# Patient Record
Sex: MALE | Race: WHITE | Employment: FULL TIME | URBAN - METROPOLITAN AREA
[De-identification: names, ages, dates, MRNs, and addresses within clinical notes are randomized per-mention and may not be internally consistent; named-entity substitution may affect disease eponyms.]

---

## 2020-04-22 RX ORDER — GUAIFENESIN 100 MG/5ML
81 LIQUID (ML) ORAL DAILY
COMMUNITY

## 2020-04-22 RX ORDER — OXYCODONE HYDROCHLORIDE 5 MG/1
5 TABLET ORAL
COMMUNITY

## 2020-04-22 RX ORDER — IBUPROFEN 200 MG
200 TABLET ORAL
COMMUNITY

## 2020-04-24 ENCOUNTER — ANESTHESIA EVENT (OUTPATIENT)
Dept: SURGERY | Age: 65
End: 2020-04-24
Payer: COMMERCIAL

## 2020-04-24 NOTE — ANESTHESIA PREPROCEDURE EVALUATION
Relevant Problems   No relevant active problems       Anesthetic History   No history of anesthetic complications            Review of Systems / Medical History  Patient summary reviewed, nursing notes reviewed and pertinent labs reviewed    Pulmonary          Smoker         Neuro/Psych   Within defined limits           Cardiovascular  Within defined limits                     GI/Hepatic/Renal  Within defined limits              Endo/Other  Within defined limits           Other Findings              Physical Exam    Airway  Mallampati: II  TM Distance: > 6 cm  Neck ROM: normal range of motion   Mouth opening: Normal     Cardiovascular  Regular rate and rhythm,  S1 and S2 normal,  no murmur, click, rub, or gallop             Dental    Dentition: Upper partial plate     Pulmonary  Breath sounds clear to auscultation               Abdominal  GI exam deferred       Other Findings            Anesthetic Plan    ASA: 2, emergent  Anesthesia type: general      Post-op pain plan if not by surgeon: peripheral nerve block single    Induction: Intravenous  Anesthetic plan and risks discussed with: Patient

## 2020-04-27 ENCOUNTER — ANESTHESIA (OUTPATIENT)
Dept: SURGERY | Age: 65
End: 2020-04-27
Payer: COMMERCIAL

## 2020-04-27 ENCOUNTER — APPOINTMENT (OUTPATIENT)
Dept: GENERAL RADIOLOGY | Age: 65
End: 2020-04-27
Attending: ORTHOPAEDIC SURGERY
Payer: COMMERCIAL

## 2020-04-27 ENCOUNTER — HOSPITAL ENCOUNTER (OUTPATIENT)
Age: 65
Discharge: HOME | End: 2020-04-28
Attending: ORTHOPAEDIC SURGERY | Admitting: ORTHOPAEDIC SURGERY
Payer: COMMERCIAL

## 2020-04-27 PROBLEM — Z98.890 S/P FOOT SURGERY, RIGHT: Status: ACTIVE | Noted: 2020-04-27

## 2020-04-27 PROBLEM — S93.324A CLOSED DISLOCATION OF TARSOMETATARSAL JOINT OF RIGHT FOOT: Status: ACTIVE | Noted: 2020-04-27

## 2020-04-27 LAB — HGB BLD-MCNC: 12.2 G/DL (ref 12.1–17)

## 2020-04-27 PROCEDURE — 74011250636 HC RX REV CODE- 250/636: Performed by: ANESTHESIOLOGY

## 2020-04-27 PROCEDURE — C1734 ORTH/DEVIC/DRUG BN/BN,TIS/BN: HCPCS | Performed by: ORTHOPAEDIC SURGERY

## 2020-04-27 PROCEDURE — 77030040361 HC SLV COMPR DVT MDII -B

## 2020-04-27 PROCEDURE — 74011250636 HC RX REV CODE- 250/636: Performed by: NURSE ANESTHETIST, CERTIFIED REGISTERED

## 2020-04-27 PROCEDURE — 76060000041 HC ANESTHESIA 5 TO 5.5 HR: Performed by: ORTHOPAEDIC SURGERY

## 2020-04-27 PROCEDURE — 74011250636 HC RX REV CODE- 250/636: Performed by: ORTHOPAEDIC SURGERY

## 2020-04-27 PROCEDURE — 99218 HC RM OBSERVATION: CPT

## 2020-04-27 PROCEDURE — 73620 X-RAY EXAM OF FOOT: CPT

## 2020-04-27 PROCEDURE — 85018 HEMOGLOBIN: CPT

## 2020-04-27 PROCEDURE — 74011000250 HC RX REV CODE- 250: Performed by: NURSE ANESTHETIST, CERTIFIED REGISTERED

## 2020-04-27 PROCEDURE — 77030002916 HC SUT ETHLN J&J -A: Performed by: ORTHOPAEDIC SURGERY

## 2020-04-27 PROCEDURE — 77030031139 HC SUT VCRL2 J&J -A: Performed by: ORTHOPAEDIC SURGERY

## 2020-04-27 PROCEDURE — 77030002933 HC SUT MCRYL J&J -A: Performed by: ORTHOPAEDIC SURGERY

## 2020-04-27 PROCEDURE — 77030020274 HC MISC IMPL ORTHOPEDIC: Performed by: ORTHOPAEDIC SURGERY

## 2020-04-27 PROCEDURE — 77030006788 HC BLD SAW OSC STRY -B: Performed by: ORTHOPAEDIC SURGERY

## 2020-04-27 PROCEDURE — 77030021807 HC PIN TEMP FIX WRGH -B: Performed by: ORTHOPAEDIC SURGERY

## 2020-04-27 PROCEDURE — C1713 ANCHOR/SCREW BN/BN,TIS/BN: HCPCS | Performed by: ORTHOPAEDIC SURGERY

## 2020-04-27 PROCEDURE — 77030040922 HC BLNKT HYPOTHRM STRY -A

## 2020-04-27 PROCEDURE — 77030020275 HC MISC ORTHOPEDIC: Performed by: ORTHOPAEDIC SURGERY

## 2020-04-27 PROCEDURE — 77030003044 HC WRE K WRGH -B: Performed by: ORTHOPAEDIC SURGERY

## 2020-04-27 PROCEDURE — 77030003045 HC BIT DRL DISP WRGH -C: Performed by: ORTHOPAEDIC SURGERY

## 2020-04-27 PROCEDURE — 77030039497 HC CST PAD STERILE CHCS -A: Performed by: ORTHOPAEDIC SURGERY

## 2020-04-27 PROCEDURE — 77030018836 HC SOL IRR NACL ICUM -A: Performed by: ORTHOPAEDIC SURGERY

## 2020-04-27 PROCEDURE — 77030012893

## 2020-04-27 PROCEDURE — 77030018673: Performed by: ORTHOPAEDIC SURGERY

## 2020-04-27 PROCEDURE — 76010000137 HC OR TIME 5 TO 5.5 HR: Performed by: ORTHOPAEDIC SURGERY

## 2020-04-27 PROCEDURE — 74011000250 HC RX REV CODE- 250: Performed by: ORTHOPAEDIC SURGERY

## 2020-04-27 PROCEDURE — 77030040361 HC SLV COMPR DVT MDII -B: Performed by: ORTHOPAEDIC SURGERY

## 2020-04-27 PROCEDURE — 77010033678 HC OXYGEN DAILY

## 2020-04-27 PROCEDURE — 77030011640 HC PAD GRND REM COVD -A: Performed by: ORTHOPAEDIC SURGERY

## 2020-04-27 PROCEDURE — 77030003601 HC NDL NRV BLK BBMI -A

## 2020-04-27 PROCEDURE — 74011250637 HC RX REV CODE- 250/637: Performed by: ANESTHESIOLOGY

## 2020-04-27 PROCEDURE — 74011250637 HC RX REV CODE- 250/637: Performed by: ORTHOPAEDIC SURGERY

## 2020-04-27 PROCEDURE — 77030000032 HC CUF TRNQT ZIMM -B: Performed by: ORTHOPAEDIC SURGERY

## 2020-04-27 PROCEDURE — 76210000006 HC OR PH I REC 0.5 TO 1 HR: Performed by: ORTHOPAEDIC SURGERY

## 2020-04-27 PROCEDURE — 74011000272 HC RX REV CODE- 272: Performed by: ORTHOPAEDIC SURGERY

## 2020-04-27 PROCEDURE — 77030005513 HC CATH URETH FOL11 MDII -B: Performed by: ORTHOPAEDIC SURGERY

## 2020-04-27 PROCEDURE — 77030012771 HC BIT DRL WRGH -B: Performed by: ORTHOPAEDIC SURGERY

## 2020-04-27 DEVICE — LISFRANC SCREW
Type: IMPLANTABLE DEVICE | Site: FOOT | Status: FUNCTIONAL
Brand: CHARLOTTE

## 2020-04-27 DEVICE — GRAFT BONE TIB INJ 1.5CC ALLOGRFT AUGMENT: Type: IMPLANTABLE DEVICE | Site: FOOT | Status: FUNCTIONAL

## 2020-04-27 RX ORDER — MORPHINE SULFATE 10 MG/ML
2 INJECTION, SOLUTION INTRAMUSCULAR; INTRAVENOUS
Status: DISCONTINUED | OUTPATIENT
Start: 2020-04-27 | End: 2020-04-27 | Stop reason: HOSPADM

## 2020-04-27 RX ORDER — ROPIVACAINE HYDROCHLORIDE 5 MG/ML
150 INJECTION, SOLUTION EPIDURAL; INFILTRATION; PERINEURAL AS NEEDED
Status: DISCONTINUED | OUTPATIENT
Start: 2020-04-27 | End: 2020-04-27 | Stop reason: HOSPADM

## 2020-04-27 RX ORDER — CEFAZOLIN SODIUM/WATER 2 G/20 ML
2 SYRINGE (ML) INTRAVENOUS EVERY 8 HOURS
Status: COMPLETED | OUTPATIENT
Start: 2020-04-27 | End: 2020-04-28

## 2020-04-27 RX ORDER — OXYCODONE HYDROCHLORIDE 5 MG/1
5 TABLET ORAL AS NEEDED
Status: DISCONTINUED | OUTPATIENT
Start: 2020-04-27 | End: 2020-04-27 | Stop reason: HOSPADM

## 2020-04-27 RX ORDER — SODIUM CHLORIDE 9 MG/ML
25 INJECTION, SOLUTION INTRAVENOUS CONTINUOUS
Status: DISCONTINUED | OUTPATIENT
Start: 2020-04-27 | End: 2020-04-27 | Stop reason: HOSPADM

## 2020-04-27 RX ORDER — HYDROMORPHONE HYDROCHLORIDE 2 MG/ML
INJECTION, SOLUTION INTRAMUSCULAR; INTRAVENOUS; SUBCUTANEOUS AS NEEDED
Status: DISCONTINUED | OUTPATIENT
Start: 2020-04-27 | End: 2020-04-27 | Stop reason: HOSPADM

## 2020-04-27 RX ORDER — ONDANSETRON 2 MG/ML
4 INJECTION INTRAMUSCULAR; INTRAVENOUS AS NEEDED
Status: DISCONTINUED | OUTPATIENT
Start: 2020-04-27 | End: 2020-04-27 | Stop reason: HOSPADM

## 2020-04-27 RX ORDER — MIDAZOLAM HYDROCHLORIDE 1 MG/ML
1 INJECTION, SOLUTION INTRAMUSCULAR; INTRAVENOUS AS NEEDED
Status: DISCONTINUED | OUTPATIENT
Start: 2020-04-27 | End: 2020-04-27 | Stop reason: HOSPADM

## 2020-04-27 RX ORDER — MORPHINE SULFATE 2 MG/ML
2 INJECTION, SOLUTION INTRAMUSCULAR; INTRAVENOUS
Status: DISCONTINUED | OUTPATIENT
Start: 2020-04-27 | End: 2020-04-28 | Stop reason: HOSPADM

## 2020-04-27 RX ORDER — NEOSTIGMINE METHYLSULFATE 1 MG/ML
INJECTION, SOLUTION INTRAVENOUS AS NEEDED
Status: DISCONTINUED | OUTPATIENT
Start: 2020-04-27 | End: 2020-04-27 | Stop reason: HOSPADM

## 2020-04-27 RX ORDER — MIDAZOLAM HYDROCHLORIDE 1 MG/ML
INJECTION, SOLUTION INTRAMUSCULAR; INTRAVENOUS AS NEEDED
Status: DISCONTINUED | OUTPATIENT
Start: 2020-04-27 | End: 2020-04-27 | Stop reason: HOSPADM

## 2020-04-27 RX ORDER — FENTANYL CITRATE 50 UG/ML
50 INJECTION, SOLUTION INTRAMUSCULAR; INTRAVENOUS AS NEEDED
Status: DISCONTINUED | OUTPATIENT
Start: 2020-04-27 | End: 2020-04-27 | Stop reason: HOSPADM

## 2020-04-27 RX ORDER — ACETAMINOPHEN 325 MG/1
650 TABLET ORAL ONCE
Status: COMPLETED | OUTPATIENT
Start: 2020-04-27 | End: 2020-04-27

## 2020-04-27 RX ORDER — SUCCINYLCHOLINE CHLORIDE 20 MG/ML
INJECTION INTRAMUSCULAR; INTRAVENOUS AS NEEDED
Status: DISCONTINUED | OUTPATIENT
Start: 2020-04-27 | End: 2020-04-27 | Stop reason: HOSPADM

## 2020-04-27 RX ORDER — PROPOFOL 10 MG/ML
INJECTION, EMULSION INTRAVENOUS AS NEEDED
Status: DISCONTINUED | OUTPATIENT
Start: 2020-04-27 | End: 2020-04-27 | Stop reason: HOSPADM

## 2020-04-27 RX ORDER — GLYCOPYRROLATE 0.2 MG/ML
INJECTION INTRAMUSCULAR; INTRAVENOUS AS NEEDED
Status: DISCONTINUED | OUTPATIENT
Start: 2020-04-27 | End: 2020-04-27 | Stop reason: HOSPADM

## 2020-04-27 RX ORDER — LIDOCAINE HYDROCHLORIDE 10 MG/ML
0.1 INJECTION, SOLUTION EPIDURAL; INFILTRATION; INTRACAUDAL; PERINEURAL AS NEEDED
Status: DISCONTINUED | OUTPATIENT
Start: 2020-04-27 | End: 2020-04-27 | Stop reason: HOSPADM

## 2020-04-27 RX ORDER — LIDOCAINE HYDROCHLORIDE 20 MG/ML
INJECTION, SOLUTION EPIDURAL; INFILTRATION; INTRACAUDAL; PERINEURAL AS NEEDED
Status: DISCONTINUED | OUTPATIENT
Start: 2020-04-27 | End: 2020-04-27 | Stop reason: HOSPADM

## 2020-04-27 RX ORDER — FENTANYL CITRATE 50 UG/ML
INJECTION, SOLUTION INTRAMUSCULAR; INTRAVENOUS AS NEEDED
Status: DISCONTINUED | OUTPATIENT
Start: 2020-04-27 | End: 2020-04-27 | Stop reason: HOSPADM

## 2020-04-27 RX ORDER — DEXAMETHASONE SODIUM PHOSPHATE 4 MG/ML
INJECTION, SOLUTION INTRA-ARTICULAR; INTRALESIONAL; INTRAMUSCULAR; INTRAVENOUS; SOFT TISSUE AS NEEDED
Status: DISCONTINUED | OUTPATIENT
Start: 2020-04-27 | End: 2020-04-27 | Stop reason: HOSPADM

## 2020-04-27 RX ORDER — SODIUM CHLORIDE 0.9 % (FLUSH) 0.9 %
5-40 SYRINGE (ML) INJECTION AS NEEDED
Status: DISCONTINUED | OUTPATIENT
Start: 2020-04-27 | End: 2020-04-28 | Stop reason: HOSPADM

## 2020-04-27 RX ORDER — PHENYLEPHRINE HCL IN 0.9% NACL 0.4MG/10ML
SYRINGE (ML) INTRAVENOUS AS NEEDED
Status: DISCONTINUED | OUTPATIENT
Start: 2020-04-27 | End: 2020-04-27 | Stop reason: HOSPADM

## 2020-04-27 RX ORDER — ROCURONIUM BROMIDE 10 MG/ML
INJECTION, SOLUTION INTRAVENOUS AS NEEDED
Status: DISCONTINUED | OUTPATIENT
Start: 2020-04-27 | End: 2020-04-27 | Stop reason: HOSPADM

## 2020-04-27 RX ORDER — ROPIVACAINE HYDROCHLORIDE 5 MG/ML
INJECTION, SOLUTION EPIDURAL; INFILTRATION; PERINEURAL
Status: COMPLETED | OUTPATIENT
Start: 2020-04-27 | End: 2020-04-27

## 2020-04-27 RX ORDER — SODIUM CHLORIDE, SODIUM LACTATE, POTASSIUM CHLORIDE, CALCIUM CHLORIDE 600; 310; 30; 20 MG/100ML; MG/100ML; MG/100ML; MG/100ML
100 INJECTION, SOLUTION INTRAVENOUS CONTINUOUS
Status: DISCONTINUED | OUTPATIENT
Start: 2020-04-27 | End: 2020-04-27 | Stop reason: HOSPADM

## 2020-04-27 RX ORDER — SODIUM CHLORIDE, SODIUM LACTATE, POTASSIUM CHLORIDE, CALCIUM CHLORIDE 600; 310; 30; 20 MG/100ML; MG/100ML; MG/100ML; MG/100ML
1000 INJECTION, SOLUTION INTRAVENOUS CONTINUOUS
Status: DISCONTINUED | OUTPATIENT
Start: 2020-04-27 | End: 2020-04-27

## 2020-04-27 RX ORDER — SODIUM CHLORIDE, SODIUM LACTATE, POTASSIUM CHLORIDE, CALCIUM CHLORIDE 600; 310; 30; 20 MG/100ML; MG/100ML; MG/100ML; MG/100ML
75 INJECTION, SOLUTION INTRAVENOUS CONTINUOUS
Status: DISPENSED | OUTPATIENT
Start: 2020-04-27 | End: 2020-04-28

## 2020-04-27 RX ORDER — SODIUM CHLORIDE 0.9 % (FLUSH) 0.9 %
5-40 SYRINGE (ML) INJECTION EVERY 8 HOURS
Status: DISCONTINUED | OUTPATIENT
Start: 2020-04-27 | End: 2020-04-28 | Stop reason: HOSPADM

## 2020-04-27 RX ORDER — EPHEDRINE SULFATE/0.9% NACL/PF 50 MG/5 ML
SYRINGE (ML) INTRAVENOUS AS NEEDED
Status: DISCONTINUED | OUTPATIENT
Start: 2020-04-27 | End: 2020-04-27 | Stop reason: HOSPADM

## 2020-04-27 RX ORDER — OXYCODONE HYDROCHLORIDE 5 MG/1
5 TABLET ORAL
Status: DISCONTINUED | OUTPATIENT
Start: 2020-04-27 | End: 2020-04-28 | Stop reason: HOSPADM

## 2020-04-27 RX ORDER — HYDROMORPHONE HYDROCHLORIDE 1 MG/ML
0.2 INJECTION, SOLUTION INTRAMUSCULAR; INTRAVENOUS; SUBCUTANEOUS
Status: DISCONTINUED | OUTPATIENT
Start: 2020-04-27 | End: 2020-04-27 | Stop reason: HOSPADM

## 2020-04-27 RX ORDER — CEFAZOLIN SODIUM/WATER 2 G/20 ML
2 SYRINGE (ML) INTRAVENOUS
Status: COMPLETED | OUTPATIENT
Start: 2020-04-27 | End: 2020-04-27

## 2020-04-27 RX ORDER — DIPHENHYDRAMINE HYDROCHLORIDE 50 MG/ML
12.5 INJECTION, SOLUTION INTRAMUSCULAR; INTRAVENOUS AS NEEDED
Status: DISCONTINUED | OUTPATIENT
Start: 2020-04-27 | End: 2020-04-27 | Stop reason: HOSPADM

## 2020-04-27 RX ORDER — GUAIFENESIN 100 MG/5ML
81 LIQUID (ML) ORAL DAILY
Status: DISCONTINUED | OUTPATIENT
Start: 2020-04-28 | End: 2020-04-28 | Stop reason: HOSPADM

## 2020-04-27 RX ORDER — ACETAMINOPHEN 325 MG/1
650 TABLET ORAL
Status: DISCONTINUED | OUTPATIENT
Start: 2020-04-27 | End: 2020-04-28 | Stop reason: HOSPADM

## 2020-04-27 RX ORDER — EPHEDRINE SULFATE/0.9% NACL/PF 50 MG/5 ML
5 SYRINGE (ML) INTRAVENOUS AS NEEDED
Status: DISCONTINUED | OUTPATIENT
Start: 2020-04-27 | End: 2020-04-27 | Stop reason: HOSPADM

## 2020-04-27 RX ORDER — ONDANSETRON 2 MG/ML
INJECTION INTRAMUSCULAR; INTRAVENOUS AS NEEDED
Status: DISCONTINUED | OUTPATIENT
Start: 2020-04-27 | End: 2020-04-27 | Stop reason: HOSPADM

## 2020-04-27 RX ORDER — MIDAZOLAM HYDROCHLORIDE 1 MG/ML
0.5 INJECTION, SOLUTION INTRAMUSCULAR; INTRAVENOUS
Status: DISCONTINUED | OUTPATIENT
Start: 2020-04-27 | End: 2020-04-27 | Stop reason: HOSPADM

## 2020-04-27 RX ORDER — FENTANYL CITRATE 50 UG/ML
25 INJECTION, SOLUTION INTRAMUSCULAR; INTRAVENOUS
Status: DISCONTINUED | OUTPATIENT
Start: 2020-04-27 | End: 2020-04-27 | Stop reason: HOSPADM

## 2020-04-27 RX ADMIN — HYDROMORPHONE HYDROCHLORIDE 1 MG: 2 INJECTION, SOLUTION INTRAMUSCULAR; INTRAVENOUS; SUBCUTANEOUS at 10:13

## 2020-04-27 RX ADMIN — Medication 10 ML: at 22:26

## 2020-04-27 RX ADMIN — HYDROMORPHONE HYDROCHLORIDE 0.4 MG: 2 INJECTION, SOLUTION INTRAMUSCULAR; INTRAVENOUS; SUBCUTANEOUS at 11:14

## 2020-04-27 RX ADMIN — ROPIVACAINE HYDROCHLORIDE 35 ML: 5 INJECTION, SOLUTION EPIDURAL; INFILTRATION; PERINEURAL at 07:17

## 2020-04-27 RX ADMIN — Medication 40 MCG: at 10:18

## 2020-04-27 RX ADMIN — Medication 80 MCG: at 08:04

## 2020-04-27 RX ADMIN — MIDAZOLAM 2 MG: 1 INJECTION INTRAMUSCULAR; INTRAVENOUS at 07:29

## 2020-04-27 RX ADMIN — ONDANSETRON HYDROCHLORIDE 4 MG: 2 INJECTION, SOLUTION INTRAMUSCULAR; INTRAVENOUS at 08:02

## 2020-04-27 RX ADMIN — MIDAZOLAM 2 MG: 1 INJECTION INTRAMUSCULAR; INTRAVENOUS at 07:05

## 2020-04-27 RX ADMIN — FENTANYL CITRATE 50 MCG: 50 INJECTION, SOLUTION INTRAMUSCULAR; INTRAVENOUS at 08:29

## 2020-04-27 RX ADMIN — PROPOFOL 50 MG: 10 INJECTION, EMULSION INTRAVENOUS at 08:29

## 2020-04-27 RX ADMIN — SODIUM CHLORIDE, SODIUM LACTATE, POTASSIUM CHLORIDE, AND CALCIUM CHLORIDE: 600; 310; 30; 20 INJECTION, SOLUTION INTRAVENOUS at 09:40

## 2020-04-27 RX ADMIN — SODIUM CHLORIDE, SODIUM LACTATE, POTASSIUM CHLORIDE, AND CALCIUM CHLORIDE: 600; 310; 30; 20 INJECTION, SOLUTION INTRAVENOUS at 12:07

## 2020-04-27 RX ADMIN — Medication 2 G: at 07:46

## 2020-04-27 RX ADMIN — PROPOFOL 100 MG: 10 INJECTION, EMULSION INTRAVENOUS at 08:17

## 2020-04-27 RX ADMIN — ROCURONIUM BROMIDE 10 MG: 10 SOLUTION INTRAVENOUS at 08:17

## 2020-04-27 RX ADMIN — GLYCOPYRROLATE 0.2 MG: 0.2 INJECTION, SOLUTION INTRAMUSCULAR; INTRAVENOUS at 11:40

## 2020-04-27 RX ADMIN — OXYCODONE 5 MG: 5 TABLET ORAL at 22:26

## 2020-04-27 RX ADMIN — Medication 2 G: at 11:50

## 2020-04-27 RX ADMIN — FENTANYL CITRATE 25 MCG: 50 INJECTION, SOLUTION INTRAMUSCULAR; INTRAVENOUS at 09:12

## 2020-04-27 RX ADMIN — ROCURONIUM BROMIDE 10 MG: 10 SOLUTION INTRAVENOUS at 10:52

## 2020-04-27 RX ADMIN — Medication 2 MG: at 12:05

## 2020-04-27 RX ADMIN — SODIUM CHLORIDE, SODIUM LACTATE, POTASSIUM CHLORIDE, AND CALCIUM CHLORIDE 1000 ML: 600; 310; 30; 20 INJECTION, SOLUTION INTRAVENOUS at 06:52

## 2020-04-27 RX ADMIN — GLYCOPYRROLATE 0.4 MG: 0.2 INJECTION, SOLUTION INTRAMUSCULAR; INTRAVENOUS at 12:05

## 2020-04-27 RX ADMIN — ROCURONIUM BROMIDE 5 MG: 10 SOLUTION INTRAVENOUS at 07:40

## 2020-04-27 RX ADMIN — Medication 10 ML: at 14:49

## 2020-04-27 RX ADMIN — PROPOFOL 170 MG: 10 INJECTION, EMULSION INTRAVENOUS at 07:40

## 2020-04-27 RX ADMIN — FENTANYL CITRATE 100 MCG: 50 INJECTION INTRAMUSCULAR; INTRAVENOUS at 07:05

## 2020-04-27 RX ADMIN — LIDOCAINE HYDROCHLORIDE 60 MG: 20 INJECTION, SOLUTION EPIDURAL; INFILTRATION; INTRACAUDAL; PERINEURAL at 07:40

## 2020-04-27 RX ADMIN — PROPOFOL 30 MG: 10 INJECTION, EMULSION INTRAVENOUS at 07:50

## 2020-04-27 RX ADMIN — ACETAMINOPHEN 650 MG: 325 TABLET ORAL at 06:43

## 2020-04-27 RX ADMIN — OXYCODONE 5 MG: 5 TABLET ORAL at 18:30

## 2020-04-27 RX ADMIN — SUCCINYLCHOLINE CHLORIDE 160 MG: 20 INJECTION, SOLUTION INTRAMUSCULAR; INTRAVENOUS at 07:40

## 2020-04-27 RX ADMIN — HYDROMORPHONE HYDROCHLORIDE 0.5 MG: 2 INJECTION, SOLUTION INTRAMUSCULAR; INTRAVENOUS; SUBCUTANEOUS at 12:36

## 2020-04-27 RX ADMIN — DEXAMETHASONE SODIUM PHOSPHATE 4 MG: 4 INJECTION, SOLUTION INTRAMUSCULAR; INTRAVENOUS at 08:02

## 2020-04-27 RX ADMIN — FENTANYL CITRATE 25 MCG: 50 INJECTION, SOLUTION INTRAMUSCULAR; INTRAVENOUS at 09:40

## 2020-04-27 RX ADMIN — FENTANYL CITRATE 25 MCG: 50 INJECTION INTRAMUSCULAR; INTRAVENOUS at 12:50

## 2020-04-27 RX ADMIN — Medication 40 MCG: at 08:11

## 2020-04-27 RX ADMIN — FENTANYL CITRATE 25 MCG: 50 INJECTION INTRAMUSCULAR; INTRAVENOUS at 12:45

## 2020-04-27 RX ADMIN — Medication 80 MCG: at 10:37

## 2020-04-27 RX ADMIN — ROCURONIUM BROMIDE 25 MG: 10 SOLUTION INTRAVENOUS at 07:50

## 2020-04-27 RX ADMIN — Medication 10 MG: at 10:24

## 2020-04-27 RX ADMIN — CEFAZOLIN SODIUM 2 G: 300 INJECTION, POWDER, LYOPHILIZED, FOR SOLUTION INTRAVENOUS at 19:22

## 2020-04-27 RX ADMIN — OXYCODONE 5 MG: 5 TABLET ORAL at 14:45

## 2020-04-27 RX ADMIN — Medication 10 MG: at 11:42

## 2020-04-27 RX ADMIN — Medication 40 MCG: at 10:21

## 2020-04-27 NOTE — PROGRESS NOTES
POST OP CHECK:    S/p right foot lisfranc joint dislocation/fracture arthrodesis. Doing okay, no complaints. Patient has a pain block right lower extremity. AFVSS. Alert but still sleepy. No distress. Right foot dressing c/d/i  Foot is warm and has excellent capillary refill. Calves soft and NT. Doing okay post op today. 1. Plan to dc home tomorrow on pod #1 if doing well and pain manageable with p.o.medications. 2. IV antibiotics. 3. Aspirin for DVT prophylaxis. 4. Follow up Dr. Richy Niocle in 13 to 16 days post op. Call if any problems for quicker follow if needed. 5. Splint remains intact, keep c/d and call if any problems with splint. 6. See detailed follow up information in chart. 7. Patient was provided post op medications and should have ready to take at home.

## 2020-04-27 NOTE — PROGRESS NOTES
Bedside and Verbal shift change report given to Corky Ortiz RN (oncoming nurse) by Sherif Garcia RN (offgoing nurse). Report included the following information SBAR, Kardex, Intake/Output, MAR and Recent Results.

## 2020-04-27 NOTE — PROGRESS NOTES
Primary Nurse Thomas Green and Deborah Shanks RN performed a dual skin assessment on this patient No impairment noted  Jorge score is 21

## 2020-04-27 NOTE — ANESTHESIA PROCEDURE NOTES
Peripheral Block    Start time: 4/27/2020 7:05 AM  End time: 4/27/2020 7:17 AM  Performed by: Ria Perez MD  Authorized by: Ria Perez MD       Pre-procedure:    Indications: at surgeon's request, post-op pain management and procedure for pain    Preanesthetic Checklist: patient identified, risks and benefits discussed, site marked, timeout performed, anesthesia consent given and patient being monitored    Timeout Time: 07:05          Block Type:   Block Type:  Popliteal  Laterality:  Right  Monitoring:  Standard ASA monitoring, continuous pulse ox, frequent vital sign checks, heart rate, responsive to questions and oxygen  Injection Technique:  Single shot  Procedures: nerve stimulator    Patient Position: supine  Prep: chlorhexidine    Location:  Lower thigh  Needle Type:  Stimuplex  Needle Gauge:  22 G  Needle Localization:  Nerve stimulator  Motor Response: minimal motor response >0.4 mA      Assessment:  Number of attempts:  1  Injection Assessment:  Incremental injection every 5 mL, negative aspiration for CSF, negative aspiration for blood, no paresthesia and no intravascular symptoms  Patient tolerance:  Patient tolerated the procedure well with no immediate complications

## 2020-04-27 NOTE — ROUTINE PROCESS
Patient: Candelaria Thomas MRN: [id number]  SSN: [id number]   YOB: 1955  Age: 72 y.o. Sex: male     Patient is status post Procedure(s):  RIGHT FOOT TARSOMETATARSAL MULTIPLE ARTHRODESIS (GENERAL W/ BLOCK). Surgeon(s) and Role:     Yossi Leos MD - Primary    Local/Dose/Irrigation:                    Peripheral IV 04/27/20 Left;Posterior Hand (Active)   Site Assessment Clean, dry, & intact 4/27/2020  6:51 AM   Phlebitis Assessment 0 4/27/2020  6:51 AM   Dressing Status Clean, dry, & intact; New 4/27/2020  6:51 AM   Dressing Type Tape;Transparent 4/27/2020  6:51 AM   Hub Color/Line Status Green; Infusing 4/27/2020  6:51 AM            Airway - Endotracheal Tube 04/27/20 Oral (Active)                   Dressing/Packing:  Wound Leg Right-Dressing Type: 4 x 4;ABD pad;Xeroform (04/27/20 1158)    Splint/Cast: Wound Leg Right-Splint Type/Material: Splint, plaster] Ace bandage    Other:

## 2020-04-27 NOTE — ANESTHESIA POSTPROCEDURE EVALUATION
Post-Anesthesia Evaluation and Assessment    Patient: Joo Jane MRN: [id number]  SSN: [id number]    YOB: 1955  Age: 72 y.o. Sex: male      I have evaluated the patient and they are stable and ready for discharge from the PACU. Cardiovascular Function/Vital Signs  Visit Vitals  /50 (BP 1 Location: Right arm, BP Patient Position: At rest)   Pulse 89   Temp 36.7 °C (98.1 °F)   Resp 18   Ht 5' 10\" (1.778 m)   Wt 77.1 kg (170 lb)   SpO2 97%   BMI 24.39 kg/m²       Patient is status post General anesthesia for Procedure(s):  RIGHT FOOT TARSOMETATARSAL MULTIPLE ARTHRODESIS (GENERAL W/ BLOCK). Nausea/Vomiting: None    Postoperative hydration reviewed and adequate. Pain:  Pain Scale 1: FLACC(\"a little bit\" ) (04/27/20 1240)  Pain Intensity 1: 4 (04/27/20 0619)   Managed    Neurological Status:   Neuro (WDL): Within Defined Limits (04/27/20 1240)   At baseline    Mental Status, Level of Consciousness: Alert and  oriented to person, place, and time    Pulmonary Status:   O2 Device: Nasal cannula (04/27/20 1240)   Adequate oxygenation and airway patent    Complications related to anesthesia: None    Post-anesthesia assessment completed. No concerns    Signed By: Shu Head MD     April 27, 2020              Procedure(s):  RIGHT FOOT TARSOMETATARSAL MULTIPLE ARTHRODESIS (GENERAL W/ BLOCK). general    <BSHSIANPOST>    Vitals Value Taken Time   /65 4/27/2020 12:45 PM   Temp 36.7 °C (98.1 °F) 4/27/2020 12:40 PM   Pulse 74 4/27/2020 12:50 PM   Resp 14 4/27/2020 12:50 PM   SpO2 96 % 4/27/2020 12:50 PM   Vitals shown include unvalidated device data.

## 2020-04-27 NOTE — PERIOP NOTES
3107 timeout for a right foot nerve block with Dr. Castillo Sweet. Patient marked, consented, and placed on monitor. Patient was given 2 mg of versed and 100 mcg of fentanyl. 40 ml of 0.5% ropivacaine used for the block. Will continue to monitor.

## 2020-04-27 NOTE — BRIEF OP NOTE
Brief Postoperative Note    Patient: Candelaria Thomas  YOB: 1955  MRN: [id number]    Date of Procedure: 4/27/2020     Pre-Op Diagnosis: RIGHT FOOT TARSOMETATARSAL DISLOCATION WITH FRACTURES    Post-Op Diagnosis: Right foot multiple tarsometatarsal dislocation with fractures, closed, displaced. Procedure(s):  RIGHT FOOT TARSOMETATARSAL MULTIPLE ARTHRODESIS (GENERAL W/ BLOCK) OF 1ST, 2ND, 3RD TMT JONITS, MEDIAL AND MIDDLE CUNIEFORM    Surgeon(s):  Daja Shields MD    Surgical Assistant: None    Anesthesia: General     Estimated Blood Loss (mL): less than 50     Complications: None    Specimens: * No specimens in log *     Implants:   Implant Name Type Inv. Item Serial No.  Lot No. LRB No. Used Action   GRAFT BNE AUGMENT 1.5ML INJCT -- AUGMENT - SN/A  GRAFT BNE AUGMENT 1.5ML INJCT -- AUGMENT N/A [employer] [id number] Right 1 Implanted   3.7x38mm screw Screw  NA [employer] NA Right 1 Implanted   SCR BNE LISFRANC 3.7X40MM -- ANABEL FOOT/ANKLE - SNA  SCR BNE LISFRANC 3.7X40MM -- ANABEL FOOT/ANKLE NA [employer] NA Right 1 Implanted   2.7x32mm locking screw Screw  NA [employer] NA Right 1 Implanted   3.5x40mm locking screw Screw  NA [employer] NA Right 1 Implanted   2.7x12mm locking screw Screw  NA [employer] NA Right 1 Implanted   2.7x16mm locking screw Screw  NA [employer] NA Right 2 Implanted   4 hole T-plate Screw  NA [employer] NA Right 1 Implanted       Drains:   [REMOVED] Orogastric Tube 04/27/20 (Removed)       Findings: There is severely comminuted fraactures at base of 1st, 2nd, 3rd metatarsals, middle and medial cunieform fractures, subluxation of lateral 4th and 5th TMT joints.     Electronically Signed by Farrah Price MD on 4/27/2020 at 11:55 AM

## 2020-04-27 NOTE — PERIOP NOTES
TRANSFER - OUT REPORT:    Verbal report given to NURSE MALU on Alne Erb  being transferred to [facility](unit) for routine post - op       Report consisted of patients Situation, Background, Assessment and   Recommendations(SBAR). Time Pre op antibiotic given:ANCEF 2 Ivette@[employer] + ANCEF 2 Red@OLSET  Anesthesia Stop time: 2542  Carlos Present on Transfer to [address] [facility] for Carlos on Chart:NO  Discharge Prescriptions with Chart:NO (DISCHARGE INSTRUCTIONS IN CHART)    Information from the following report(s) SBAR, OR Summary, Intake/Output, MAR and Procedure Verification was reviewed with the receiving nurse. Opportunity for questions and clarification was provided. Is the patient on 02? YES       L/Min 2      Is the patient on a monitor? NO    Is the nurse transporting with the patient? NO    Surgical Waiting Area notified of patient's transfer from PACU? YES      The following personal items collected during your admission accompanied patient upon transfer:   Dental Appliance: Dental Appliances: Uppers, Partials  Vision: Visual Aid: Glasses  Hearing Aid:    Jewelry:    Clothing: Clothing: (clothes bag to pacu)  Other Valuables:  Other Valuables: (S) Eyeglasses, Other (comment)(knee scooter)  Valuables sent to safe:

## 2020-04-28 VITALS
SYSTOLIC BLOOD PRESSURE: 119 MMHG | HEART RATE: 66 BPM | BODY MASS INDEX: 24.34 KG/M2 | RESPIRATION RATE: 16 BRPM | TEMPERATURE: 99.5 F | OXYGEN SATURATION: 95 % | HEIGHT: 70 IN | WEIGHT: 170 LBS | DIASTOLIC BLOOD PRESSURE: 68 MMHG

## 2020-04-28 PROCEDURE — 97161 PT EVAL LOW COMPLEX 20 MIN: CPT | Performed by: PHYSICAL THERAPIST

## 2020-04-28 PROCEDURE — 74011250636 HC RX REV CODE- 250/636: Performed by: ORTHOPAEDIC SURGERY

## 2020-04-28 PROCEDURE — 74011000250 HC RX REV CODE- 250: Performed by: ORTHOPAEDIC SURGERY

## 2020-04-28 PROCEDURE — 99218 HC RM OBSERVATION: CPT

## 2020-04-28 PROCEDURE — 77010033678 HC OXYGEN DAILY

## 2020-04-28 PROCEDURE — 74011250637 HC RX REV CODE- 250/637: Performed by: ORTHOPAEDIC SURGERY

## 2020-04-28 PROCEDURE — 97116 GAIT TRAINING THERAPY: CPT | Performed by: PHYSICAL THERAPIST

## 2020-04-28 RX ADMIN — ASPIRIN 81 MG 81 MG: 81 TABLET ORAL at 09:00

## 2020-04-28 RX ADMIN — MORPHINE SULFATE 2 MG: 2 INJECTION, SOLUTION INTRAMUSCULAR; INTRAVENOUS at 04:01

## 2020-04-28 RX ADMIN — CEFAZOLIN SODIUM 2 G: 300 INJECTION, POWDER, LYOPHILIZED, FOR SOLUTION INTRAVENOUS at 03:57

## 2020-04-28 RX ADMIN — OXYCODONE 5 MG: 5 TABLET ORAL at 02:37

## 2020-04-28 RX ADMIN — Medication 10 ML: at 07:18

## 2020-04-28 RX ADMIN — OXYCODONE 5 MG: 5 TABLET ORAL at 07:20

## 2020-04-28 NOTE — PROGRESS NOTES
PHYSICAL THERAPY EVALUATION/DISCHARGE  Patient: Booker Wilkerson (25 y.o. male)  Date: 4/28/2020  Primary Diagnosis: Closed dislocation of tarsometatarsal joint of right foot, initial encounter [S93.324A]  S/P foot surgery, right [Z98.890]  Procedure(s) (LRB):  RIGHT FOOT TARSOMETATARSAL MULTIPLE ARTHRODESIS (GENERAL W/ BLOCK) (Right) 1 Day Post-Op   Precautions:   Fall, NWB      ASSESSMENT  Based on the objective data described below, the patient presents with decreased functional mobility from baseline level of function. Patient is moving well and has experience with knee scooter and crutches on stairs prior to admit. Has no difficulty ambulating with knee scooter and does not feel that he needs to practice stairs as he was able to do them well at home. Patient has met all goals and is safe to DC from a mobility standpoint. Patient is cleared for discharge from PT standpoint:  YES [x]     NO []        Other factors to consider for discharge: at risk for falls, NWB     Further skilled acute physical therapy is not indicated at this time. PLAN :  Recommendation for discharge: (in order for the patient to meet his/her long term goals)  No skilled physical therapy/ follow up rehabilitation needs identified at this time. IF patient discharges home will need the following DME: none       SUBJECTIVE:   Patient stated I've been dealing with this at home.     OBJECTIVE DATA SUMMARY:   HISTORY:    Past Medical History:   Diagnosis Date    Ill-defined condition     right foot fracture     Past Surgical History:   Procedure Laterality Date    HX WISDOM TEETH EXTRACTION         Prior level of function: independent with mobility at baseline  Personal factors and/or comorbidities impacting plan of care:     Home Situation  Home Environment: Private residence  # Steps to Enter: 5  Rails to Enter: Yes  One/Two Story Residence: Two story, live on 1st floor  Living Alone: No  Support Systems: Spouse/Significant Other/Partner  Patient Expects to be Discharged to[de-identified] Private residence  Current DME Used/Available at Home: Other (comment), Blood pressure cuff(knee scooter, thermometer, crutches)    EXAMINATION/PRESENTATION/DECISION MAKING:   Critical Behavior:  Neurologic State: Alert  Orientation Level: Oriented X4        Hearing: Auditory  Auditory Impairment: None  Hearing Aids/Status: (NO)    Range Of Motion:  AROM: Generally decreased, functional                       Strength:    Strength: Generally decreased, functional      Functional Mobility:  Bed Mobility:  Rolling: Modified independent  Supine to Sit: Independent  Sit to Supine: Independent  Scooting: Independent  Transfers:  Sit to Stand: Supervision  Stand to Sit: Supervision                       Balance:   Sitting: Intact  Standing: Impaired  Standing - Static: Constant support;Good  Standing - Dynamic : Constant support;Good  Ambulation/Gait Training:  Distance (ft): 200 Feet (ft)  Assistive Device: Gait belt; Other (comment)(knee scooter)  Ambulation - Level of Assistance: Modified independent     Gait Description (WDL): Exceptions to WDL           Base of Support: Narrowed          Pain Rating:  No c/o pain    Activity Tolerance:   Good  Please refer to the flowsheet for vital signs taken during this treatment. After treatment patient left in no apparent distress:   Supine in bed and Call bell within reach    COMMUNICATION/EDUCATION:   The patients plan of care was discussed with: Physical therapist, Occupational therapist and Registered nurse. Fall prevention education was provided and the patient/caregiver indicated understanding., Patient/family have participated as able in goal setting and plan of care. and Patient/family agree to work toward stated goals and plan of care.     Thank you for this referral.  John Paul Ni, PT, DPT   Time Calculation: 18 mins

## 2020-04-28 NOTE — OP NOTES
[address] [facility]  OPERATIVE REPORT    Name:  Shirley Chambers  MR#:  [id number]  :  1955  ACCOUNT #:  [de-identified]  DATE OF SERVICE:  2020    PREOPERATIVE DIAGNOSES:  1. Right foot closed displaced tarsometatarsal dislocation with multiple fractures. 2.  Right foot dislocation with second, third, fourth metatarsal base fractures, displaced, comminuted. 3.  Right lower extremity injury status post fall. POSTOPERATIVE DIAGNOSIS:   1. Right foot closed displaced tarsometatarsal dislocation at 1st, 2nd, 3rd, 4th and 5th TMT joints  2. Right foot multiple metatarsal fractures of 1st, 2nd, 3rd, 4th metatarsals, closed displaced, comminuted. 3.  Right foot s/p closed manipulation with percutaneous pin fixation. 4.  Right foot pain s/p injury. PROCEDURE PERFORMED:  1. Right foot Arthrodesis of the first, second, third tarsometatarsal joints, medial and middle cuneiform,and  Lisfranc joint, with internal fixation status post reduction and joint preparation for fusion/arthrodesis. 2.  Right foot open reduction of dislocated fourth and fifth tarsometatarsal joints. 3.  Right foot removal of superficial hardware/ 2 k-wires. 4.   Intraoperative fluoroscopy use and review. 5.   Right short leg splint application. SURGEON:  Grecia Gutierrez MD    ASSISTANT:  MILES Surgical assistant    ANESTHESIA:  General endotracheal and regional pain block. COMPLICATIONS:  None. SPECIMENS REMOVED:  None. IMPLANTS:  [facility] Lisfranc joint fracture implants, which includes multiple screws, using solid screws. ESTIMATED BLOOD LOSS:  Less than 50 cc. IV FLUIDS:  2 L.    TOURNIQUET TIME:  124 minutes for the first part of procedure, the tourniquet is brought down for about 27 minutes  time and then re-inflated after 27 minutes for an additional 50 minutes time. DRAINS:  None. DISPOSITION:  Stable.     INDICATIONS FOR PROCEDURE:  The patient has a right foot dislocation at the midfoot involving multiple tarsometatarsal joints. The patient was taken to the operating room about one and a half weeks ago, and he was noted to have a dislocated unstable foot was closed manipulation and percutaneously pinning using k-wire fixation to achieve better position of foot while the skin recovering  Once percutaneous pinning was completed, he was splinted and then informed to follow up for further discussion about definitive treatment of arthrodesis. He was informed, we would need to hold off on open procedure to allow the skin swelling to improve. Surgical treatment discussed at length regarding arthrodesis of the foot which would involve 3 to 4 medial midfoot joints. .  We discussed the surgical risk, potential complications, expected outcomes, postoperative limitations and time needed for recovery. The patient was informed he will have risk of arthritis and pain despite surgical treatment, most likely to the lateral joints, since that is not fused and the joints were also dislocated significantly with fractures at metatarsal base. In addition, risks for nonunion, malunion, persistent pain, anesthetic complications, blood clot and any other complications related to underlying medical condition. All questions were answered, no guarantees were made, informed consent obtained. PROCEDURE IN DETAIL:  The patient was identified in the preoperative holding area. The right foot was marked as surgical site. He was administered a regional pain block by anesthesia staff. He was brought to the operating room, placed supine, administered general anesthesia. Once achieved, he was positioned appropriately in the supine position with an ipsilateral hip bump. A well-padded tourniquet was applied around the thigh. Prepping and draping completed in the usual fashion. A time-out was called and documented. The leg was elevated and exsanguinated with an Esmarch tourniquet inflated.   The k-wires (2) are removed with a needle  without complication and passed off to assistant. There are no signs of infection to area. Extended the small incision previously made for the closed reduction, proximal and distal which is between the third and fourth metatarsals, mostly at the lateral border of the third metatarsal.  The other incision was made medial and that incision is between the first and second metatarsals. Incision was made through skin, careful dissection to avoid injury to the extensor tendon and neurovascular structures which are retracted out of surgical site during procedure. We were able to prepare the dislocation by releasing all the soft tissue healing scar tissue, and excising the multiple comminuted fractures  found at the first, second, third, and fourth tarsometatarsal joints. After release of scar tissue and removal of loose bodies, the foot joints are mobilized, so we could achieve reduction. All joints to fused are prepared by removing any other articular cartilage remaining and once completed began reconstructing the foot into anatomic position starting from medial to lateral; first reducing the medial cuneiform to the middle cuneiform using a large clamp. We were able to clamp this and inserted a screw from medial to lateral to stabilize the medial to middle cuneiform and there is good fixation and position of implant confirmed by fluoroscopy. We then proceeded to the reduction and fixation of the first tarsometatarsal joint inserting 2 screws in lag position with 3.7 solid screws. One screw inserted from distal to proximal and then another screw from proximal to distal.  Good fixation was achieved at the first TMT joint. We then proceeded to Lisfranc joint, which was reduced with a large clamp, and inserted a screw across the Lisfranc joint coming from the medial cuneiform. Once fixation was completed, we confirmed good by fluoroscopy.   Next, the second tarsometatarsal joint further stabilized with insertion of a lag screw across the 2nd TMT joint reduction again confirmed good reduction and fixation by fluoroscopy. Next, the 3rd, 4th and 5th TMT joints were further mobilized by release of scar tissue and removal of loose small bone fragments to provide open reduction of the 4th and 5th TMT joints and finally with a  large reduction clamp, the third metatarsal was reduced back into anatomic position opposing the lateral cuneiform. We then pinned the reduction with temporary fixation and applied a plate with screw construct for stable fixation. We had removed some of the bone to be able to reduce the fracture/dislocation at the 3rd metatarsal and joint, then packed all the bone back into the area at base of 3rd metatarsal base and its joint after fixation completed. Once reduction and arthrodesis fixation completed, obtained final fluoroscopy views which are saved, all all the incisions were irrigated and we immediately closed the medial incisions first using Vicryl for deep repair, subcutaneously with Monocryl, and nylon for the skin to avoid too much swelling that would hinder incision repair and tourniquet is deflated for a final time. Once all incisions were closed, we cleaned and dried the foot, sterile dressings were applied. The tourniquet was deflated twice, which is after the first 124 minutes, we deflated for approximately 27 minutes and then we inflated again for an additional 50 minutes. Once the final tourniquet was deflated, there were no significant bleeders and there was excellent capillary refill to the foot. Sterile dressings and a well-padded splint were applied. The patient was awakened from anesthesia, found stable, transferred to recovery room in same stable condition. There were no surgical or anesthetic complications during this procedure and all sponge and needle counts were correct at the end of the procedure.       THANG RAMIREZ, MD [facility]/[email or web address]/[email or web address]  D:  04/27/2020 16:59  T:  04/27/2020 23:34  JOB #:  [id number]

## 2020-04-28 NOTE — PROGRESS NOTES
Problem: Falls - Risk of  Goal: *Absence of Falls  Description: Document Starleen Freeze Fall Risk and appropriate interventions in the flowsheet. Outcome: Progressing Towards Goal  Note: Fall Risk Interventions:            Medication Interventions: Patient to call before getting OOB         History of Falls Interventions: Evaluate medications/consider consulting pharmacy    Plan of care reviewed.

## 2020-04-28 NOTE — PROGRESS NOTES
Ortho Daily Progress Note    4/28/2020  8:55 AM    POD:  1 Day Post-Op  S/P:  Procedure(s):  RIGHT FOOT TARSOMETATARSAL MULTIPLE ARTHRODESIS (GENERAL W/ BLOCK)    Afebrile/VSS, NAD, A&O x 3  Doing well without complaints of nausea  Pain well controlled- oxycodone, morphine  Calves soft/NTTP Bilaterally  Splint on RLE intact, some bloody show around the heel  Leg soft. Moving lower extremities well. Neurocirculatory exam intact and within normal range. Has a knee mobilator for ambulation  Capillary refill <2 seconds  No results found for: HGB, INR, HGBEXT, INREXT  No results for input(s): CREA, BUN in the last 7224 hours. CrCl cannot be calculated (No successful lab value found. ).     PLAN: See Dr. Annabelle Bland instructions on the front of his hard chart  DVT prophylaxis: ASA  NWB RLE with PT-mobilization  Pain Control: Tylenol, oxycodone (Has Rx already from Dr. Alaina Saldivar)  Plan to D/C home today      Felisa Phipps

## 2020-04-28 NOTE — PROGRESS NOTES
Problem: Pain  Goal: *Control of Pain  Outcome: Progressing Towards Goal  Goal: *PALLIATIVE CARE:  Alleviation of Pain  Outcome: Progressing Towards Goal     Problem: Patient Education: Go to Patient Education Activity  Goal: Patient/Family Education  Outcome: Progressing Towards Goal     Problem: Falls - Risk of  Goal: *Absence of Falls  Description: Document [facility] Fall Risk and appropriate interventions in the flowsheet.   Outcome: Progressing Towards Goal  Note: Fall Risk Interventions:            Medication Interventions: Teach patient to arise slowly, Patient to call before getting OOB         History of Falls Interventions: Evaluate medications/consider consulting pharmacy         Problem: Patient Education: Go to Patient Education Activity  Goal: Patient/Family Education  Outcome: Progressing Towards Goal

## (undated) DEVICE — ZIMMER® STERILE DISPOSABLE TOURNIQUET CUFF WITH PLC, DUAL PORT, SINGLE BLADDER, 34 IN. (86 CM)

## (undated) DEVICE — PADDING CAST 4 INX5 YD STRL

## (undated) DEVICE — PRECISION OFFSET (9.0 X 0.51 X 31.0MM)

## (undated) DEVICE — PENCIL ES L3M BTTN SWCH S STL HEX LOK BLDE ELECTRD HOLSTER

## (undated) DEVICE — ASTOUND STANDARD SURGICAL GOWN, XXL: Brand: CONVERTORS

## (undated) DEVICE — SOLUTION IV 1000ML 0.9% SOD CHL

## (undated) DEVICE — DRAPE C-ARMOUR C-ARM KIT --

## (undated) DEVICE — SUTURE MCRYL SZ 3-0 L27IN ABSRB UD L19MM PS-2 3/8 CIR PRIM Y427H

## (undated) DEVICE — SUTURE VCRL SZ 1 L36IN ABSRB UD L36MM CT-1 1/2 CIR J947H

## (undated) DEVICE — DRAPE,U/ SHT,SPLIT,PLAS,STERIL: Brand: MEDLINE

## (undated) DEVICE — SUTURE VCRL SZ 2-0 L27IN ABSRB UD L26MM SH 1/2 CIR J417H

## (undated) DEVICE — DRILL BIT

## (undated) DEVICE — PRECISION THIN (9.0 X 0.38 X 31.0MM)

## (undated) DEVICE — PADDING CST 6IN STERILE --

## (undated) DEVICE — TOTAL TRAY, 16FR 10ML SIL FOLEY, URN: Brand: MEDLINE

## (undated) DEVICE — DRESSING,GAUZE,XEROFORM,CURAD,5"X9",ST: Brand: CURAD

## (undated) DEVICE — SPONGE LAP 18X18IN STRL -- 5/PK

## (undated) DEVICE — 3M™ IOBAN™ 2 ANTIMICROBIAL INCISE DRAPE 6650EZ: Brand: IOBAN™ 2

## (undated) DEVICE — DRAPE,REIN 53X77,STERILE: Brand: MEDLINE

## (undated) DEVICE — TUBING SUCT 10FR MAL ALUM SHFT FN CAP VENT UNIV CONN W/ OBT

## (undated) DEVICE — COVER LT HNDL PLAS RIG 1 PER PK

## (undated) DEVICE — INTENDED FOR TISSUE SEPARATION, AND OTHER PROCEDURES THAT REQUIRE A SHARP SURGICAL BLADE TO PUNCTURE OR CUT.: Brand: BARD-PARKER ® CARBON RIB-BACK BLADES

## (undated) DEVICE — INFECTION CONTROL KIT SYS

## (undated) DEVICE — SUT ETHLN 3-0 18IN PS1 BLK --

## (undated) DEVICE — SUTURE VCRL SZ 0 L36IN ABSRB VLT L40MM CT 1/2 CIR J358H

## (undated) DEVICE — STERILE POLYISOPRENE POWDER-FREE SURGICAL GLOVES WITH EMOLLIENT COATING: Brand: PROTEXIS

## (undated) DEVICE — REM POLYHESIVE ADULT PATIENT RETURN ELECTRODE: Brand: VALLEYLAB

## (undated) DEVICE — SPONGE GZ W4XL4IN COT 12 PLY TYP VII WVN C FLD DSGN

## (undated) DEVICE — DRAPE,EXTREMITY,89X128,STERILE: Brand: MEDLINE

## (undated) DEVICE — Device

## (undated) DEVICE — BANDAGE,GAUZE,BULKEE II,4.5"X4.1YD,STRL: Brand: MEDLINE

## (undated) DEVICE — SUTURE VCRL SZ 2-0 L36IN ABSRB UD L36MM CT-1 1/2 CIR J945H

## (undated) DEVICE — COVER,TABLE,HEAVY DUTY,60"X90",STRL: Brand: MEDLINE

## (undated) DEVICE — GARMENT,MEDLINE,DVT,INT,CALF,MED, GEN2: Brand: MEDLINE

## (undated) DEVICE — DRAPE XR C ARM 41X74IN LF --

## (undated) DEVICE — PAD,ABDOMINAL,5"X9",ST,LF,25/BX: Brand: MEDLINE INDUSTRIES, INC.

## (undated) DEVICE — BANDAGE COMPR M W6INXL10YD WHT BGE VELC E MTRX HK AND LOOP

## (undated) DEVICE — SURGICAL PROCEDURE PACK BASIN MAJ SET CUST NO CAUT

## (undated) DEVICE — STRAP,POSITIONING,KNEE/BODY,FOAM,4X60": Brand: MEDLINE

## (undated) DEVICE — PACK,BASIC,SIRUS,V: Brand: MEDLINE

## (undated) DEVICE — BANDAGE COBAN 6 IN WND 6INX5YD FOAM